# Patient Record
Sex: FEMALE | Race: WHITE | URBAN - METROPOLITAN AREA
[De-identification: names, ages, dates, MRNs, and addresses within clinical notes are randomized per-mention and may not be internally consistent; named-entity substitution may affect disease eponyms.]

---

## 2018-06-27 ENCOUNTER — OFFICE VISIT (OUTPATIENT)
Dept: OTOLARYNGOLOGY | Facility: CLINIC | Age: 18
End: 2018-06-27

## 2018-06-27 VITALS
WEIGHT: 125 LBS | TEMPERATURE: 100 F | HEIGHT: 62 IN | SYSTOLIC BLOOD PRESSURE: 100 MMHG | BODY MASS INDEX: 23 KG/M2 | DIASTOLIC BLOOD PRESSURE: 66 MMHG

## 2018-06-27 DIAGNOSIS — J03.90 TONSILLITIS: Primary | ICD-10-CM

## 2018-06-27 PROCEDURE — 99212 OFFICE O/P EST SF 10 MIN: CPT | Performed by: OTOLARYNGOLOGY

## 2018-06-27 PROCEDURE — 99203 OFFICE O/P NEW LOW 30 MIN: CPT | Performed by: OTOLARYNGOLOGY

## 2018-06-27 NOTE — PROGRESS NOTES
Hudson Rodriguez is a 19year old female.   Patient presents with:  Sore Throat: since June 6, pt has been on a 10 day course of antibiotics with no change in symptoms   Tonsil Problem: enlarged tonsils       HISTORY OF PRESENT ILLNESS  6/27/2018  Patient prevent appearance - Normal.   Psychiatric Normal Orientation - Oriented to time, place, person & situation. Appropriate mood and affect.    Neck Exam Normal Inspection - Normal. Palpation - Normal. Parotid gland - Normal. Thyroid gland - Normal.   Eyes Normal Conj

## 2018-06-27 NOTE — PROGRESS NOTES
Spoke to Lauren Duran from pharmacy. Clindamycin 600 mg TID x10 days and Dexamethasone 6 mg PO daily x 3 days were called in with read back per Dr. Edi Samuels verbal order. No further action required at this time.